# Patient Record
Sex: FEMALE | Race: WHITE | NOT HISPANIC OR LATINO | URBAN - METROPOLITAN AREA
[De-identification: names, ages, dates, MRNs, and addresses within clinical notes are randomized per-mention and may not be internally consistent; named-entity substitution may affect disease eponyms.]

---

## 2017-03-05 ENCOUNTER — EMERGENCY (EMERGENCY)
Facility: HOSPITAL | Age: 72
LOS: 1 days | Discharge: HOME | End: 2017-03-05
Attending: EMERGENCY MEDICINE | Admitting: EMERGENCY MEDICINE
Payer: MEDICARE

## 2017-03-05 VITALS
SYSTOLIC BLOOD PRESSURE: 144 MMHG | RESPIRATION RATE: 18 BRPM | DIASTOLIC BLOOD PRESSURE: 93 MMHG | OXYGEN SATURATION: 98 % | TEMPERATURE: 98 F | HEART RATE: 60 BPM | WEIGHT: 127.87 LBS

## 2017-03-05 VITALS
TEMPERATURE: 98 F | SYSTOLIC BLOOD PRESSURE: 132 MMHG | RESPIRATION RATE: 18 BRPM | DIASTOLIC BLOOD PRESSURE: 81 MMHG | HEART RATE: 58 BPM | OXYGEN SATURATION: 96 %

## 2017-03-05 DIAGNOSIS — R07.81 PLEURODYNIA: ICD-10-CM

## 2017-03-05 DIAGNOSIS — E11.9 TYPE 2 DIABETES MELLITUS WITHOUT COMPLICATIONS: ICD-10-CM

## 2017-03-05 LAB
ALBUMIN SERPL ELPH-MCNC: 3.5 G/DL — SIGNIFICANT CHANGE UP (ref 3.4–5)
ALP SERPL-CCNC: 67 U/L — SIGNIFICANT CHANGE UP (ref 40–120)
ALT FLD-CCNC: 48 U/L — HIGH (ref 12–42)
ANION GAP SERPL CALC-SCNC: 8 MMOL/L — LOW (ref 9–16)
AST SERPL-CCNC: 33 U/L — SIGNIFICANT CHANGE UP (ref 15–37)
BASOPHILS NFR BLD AUTO: 1.9 % — SIGNIFICANT CHANGE UP (ref 0–2)
BILIRUB SERPL-MCNC: 0.3 MG/DL — SIGNIFICANT CHANGE UP (ref 0.2–1.2)
BUN SERPL-MCNC: 22 MG/DL — SIGNIFICANT CHANGE UP (ref 7–23)
CALCIUM SERPL-MCNC: 9 MG/DL — SIGNIFICANT CHANGE UP (ref 8.5–10.5)
CHLORIDE SERPL-SCNC: 101 MMOL/L — SIGNIFICANT CHANGE UP (ref 96–108)
CK MB CFR SERPL CALC: <1 NG/ML — SIGNIFICANT CHANGE UP (ref 0.5–3.6)
CK SERPL-CCNC: 60 U/L — SIGNIFICANT CHANGE UP (ref 26–192)
CO2 SERPL-SCNC: 29 MMOL/L — SIGNIFICANT CHANGE UP (ref 22–31)
CREAT SERPL-MCNC: 1.06 MG/DL — SIGNIFICANT CHANGE UP (ref 0.5–1.3)
EOSINOPHIL NFR BLD AUTO: 2.9 % — SIGNIFICANT CHANGE UP (ref 0–6)
EXTRA BLUE TOP TUBE: SIGNIFICANT CHANGE UP
EXTRA SST TUBE: SIGNIFICANT CHANGE UP
GLUCOSE SERPL-MCNC: 91 MG/DL — SIGNIFICANT CHANGE UP (ref 70–99)
HCT VFR BLD CALC: 39.1 % — SIGNIFICANT CHANGE UP (ref 34.5–45)
HGB BLD-MCNC: 13.1 G/DL — SIGNIFICANT CHANGE UP (ref 11.5–15.5)
LYMPHOCYTES # BLD AUTO: 30.6 % — SIGNIFICANT CHANGE UP (ref 13–44)
MCHC RBC-ENTMCNC: 29.4 PG — SIGNIFICANT CHANGE UP (ref 27–34)
MCHC RBC-ENTMCNC: 33.5 G/DL — SIGNIFICANT CHANGE UP (ref 32–36)
MCV RBC AUTO: 87.7 FL — SIGNIFICANT CHANGE UP (ref 80–100)
MONOCYTES NFR BLD AUTO: 17.2 % — HIGH (ref 2–14)
NEUTROPHILS NFR BLD AUTO: 47.4 % — SIGNIFICANT CHANGE UP (ref 43–77)
PLATELET # BLD AUTO: 276 K/UL — SIGNIFICANT CHANGE UP (ref 150–400)
POTASSIUM SERPL-MCNC: 5 MMOL/L — SIGNIFICANT CHANGE UP (ref 3.5–5.3)
POTASSIUM SERPL-SCNC: 5 MMOL/L — SIGNIFICANT CHANGE UP (ref 3.5–5.3)
PROT SERPL-MCNC: 7.4 G/DL — SIGNIFICANT CHANGE UP (ref 6.4–8.2)
RBC # BLD: 4.46 M/UL — SIGNIFICANT CHANGE UP (ref 3.8–5.2)
RBC # FLD: 12.8 % — SIGNIFICANT CHANGE UP (ref 10.3–16.9)
SODIUM SERPL-SCNC: 138 MMOL/L — SIGNIFICANT CHANGE UP (ref 135–145)
TROPONIN I SERPL-MCNC: <0.015 NG/ML — SIGNIFICANT CHANGE UP (ref 0.01–0.04)
WBC # BLD: 4.2 K/UL — SIGNIFICANT CHANGE UP (ref 3.8–10.5)
WBC # FLD AUTO: 4.2 K/UL — SIGNIFICANT CHANGE UP (ref 3.8–10.5)

## 2017-03-05 PROCEDURE — 99284 EMERGENCY DEPT VISIT MOD MDM: CPT | Mod: 25

## 2017-03-05 PROCEDURE — 71275 CT ANGIOGRAPHY CHEST: CPT | Mod: 26

## 2017-03-05 PROCEDURE — 93005 ELECTROCARDIOGRAM TRACING: CPT

## 2017-03-05 PROCEDURE — 99284 EMERGENCY DEPT VISIT MOD MDM: CPT

## 2017-03-05 PROCEDURE — 82550 ASSAY OF CK (CPK): CPT

## 2017-03-05 PROCEDURE — 85379 FIBRIN DEGRADATION QUANT: CPT

## 2017-03-05 PROCEDURE — 71275 CT ANGIOGRAPHY CHEST: CPT

## 2017-03-05 PROCEDURE — 80053 COMPREHEN METABOLIC PANEL: CPT

## 2017-03-05 PROCEDURE — 84484 ASSAY OF TROPONIN QUANT: CPT

## 2017-03-05 PROCEDURE — 82553 CREATINE MB FRACTION: CPT

## 2017-03-05 PROCEDURE — 85025 COMPLETE CBC W/AUTO DIFF WBC: CPT

## 2017-03-05 NOTE — ED PROVIDER NOTE - MEDICAL DECISION MAKING DETAILS
72 yo female with recent international plane travel - vague left back and chest pain and malaise- CT chest  no overt signs to suggest infilt- no ekg changes or trop leak- low susp for malaria- iv infilt- d dimer slight elevation-  no overt suspicion for ongoing PE- VSS well appearing HD stable for d/c and f/u with pmd in 1 day- return precautions d/w pt including CP sob  N/V

## 2017-03-05 NOTE — ED ADULT TRIAGE NOTE - CHIEF COMPLAINT QUOTE
chest pain started 5 days ago when going to come back from [place]- "thought I was just tired" came home and "slept for 2 days ; associated nausea and dizziness- [place] [facility] , sent here

## 2017-03-05 NOTE — ED PROVIDER NOTE - CONDUCTED A DETAILED DISCUSSION WITH PATIENT AND/OR GUARDIAN REGARDING, MDM
lab results/radiology results lab results/need for outpatient follow-up/return to ED if symptoms worsen, persist or questions arise/radiology results

## 2017-03-05 NOTE — ED ADULT NURSE NOTE - OBJECTIVE STATEMENT
pt to ER w/ report of chest pressure, back pain and dizziness since Thursday morning.  Pt reports flying to [place] from [place] Thursday night.  Pt reports occasional cough, states she had several sick contacts during past week, denies n/v/sob/abd pain.  Breathing unlabored, skin warm and dry. 12 lead ekg done and shown to ER attending physician. Will continue to monitor.

## 2017-03-05 NOTE — ED CLERICAL - NS ED CLERK NOTE PRE-ARRIVAL INFORMATION; ADDITIONAL PRE-ARRIVAL INFORMATION
MARCIN [id number] COMING IN FOR CHEST PAIN. PLEASE CHECK SETS OF HYPONIN. PLEASE EVALUATE. PLEASE CALL DR. RAPHAEL [phone] AFTER PATIENT EVALUATION.(DR. PERSAUD)

## 2017-03-05 NOTE — ED PROVIDER NOTE - OBJECTIVE STATEMENT
72 yo female with hx of elevated cholesterol DM with recent 12 hr flight 4 days ago from [place]-  dev headache and nausea on friday followed by malaise  no fevers or chills slight cough x 1 day- no leg pain or calf swelling- slight left sided pleuritic CP today - last stress test was 5 years ago and was neg - some collegaues from trip were dx with pneumonia apparently  pt denies any bug or mosquito bites

## 2020-08-24 NOTE — ED PROVIDER NOTE - MUSCULOSKELETAL, MLM
no vomiting/no diarrhea/no abdominal pain/no headache
Spine appears normal, range of motion is not limited, no muscle or joint tenderness

## 2023-08-28 ENCOUNTER — EMERGENCY (EMERGENCY)
Facility: HOSPITAL | Age: 78
LOS: 1 days | Discharge: HOME | End: 2023-08-28
Admitting: STUDENT IN AN ORGANIZED HEALTH CARE EDUCATION/TRAINING PROGRAM
Payer: MEDICARE

## 2023-08-28 VITALS
OXYGEN SATURATION: 99 % | WEIGHT: 128.09 LBS | HEIGHT: 64 IN | SYSTOLIC BLOOD PRESSURE: 112 MMHG | RESPIRATION RATE: 18 BRPM | DIASTOLIC BLOOD PRESSURE: 71 MMHG | HEART RATE: 60 BPM | TEMPERATURE: 98 F

## 2023-08-28 DIAGNOSIS — S81.801A UNSPECIFIED OPEN WOUND, RIGHT LOWER LEG, INITIAL ENCOUNTER: ICD-10-CM

## 2023-08-28 DIAGNOSIS — E78.5 HYPERLIPIDEMIA, UNSPECIFIED: ICD-10-CM
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
## 2023-08-28 PROCEDURE — 99283 EMERGENCY DEPT VISIT LOW MDM: CPT

## 2023-08-28 PROCEDURE — 99284 EMERGENCY DEPT VISIT MOD MDM: CPT

## 2023-08-28 RX ORDER — CEPHALEXIN 500 MG
1 CAPSULE ORAL
Qty: 20 | Refills: 0
Start: 2023-08-28 | End: 2023-09-01

## 2023-08-28 RX ORDER — CEPHALEXIN 500 MG
500 CAPSULE ORAL ONCE
Refills: 0 | Status: COMPLETED | OUTPATIENT
Start: 2023-08-28 | End: 2023-08-28

## 2023-08-28 RX ADMIN — Medication 500 MILLIGRAM(S): at 22:20

## 2023-08-28 NOTE — ED PROVIDER NOTE - PATIENT PORTAL LINK FT
You can access the FollowMyHealth Patient Portal offered by [facility] by registering at the following website: [email or web address]. By joining [facility]’s FollowMyHealth portal, you will also be able to view your health information using other applications (apps) compatible with our system.

## 2023-08-28 NOTE — ED ADULT NURSE NOTE - NSFALLASSESSNEED_ED_ALL_ED
[address] [facility] EMERGENCY DEPARTMENT  [place] [address]  [phone]    Work/School Note    Date: 12/12/2021    To Whom It May concern:    Lucia Gomes was seen and treated today in the emergency room by the following provider(s):  Attending Provider: Hiral Diamond MD.      Lucia Gomes is excused from work/school on 12/12/21 and 12/13/21. She is medically clear to return to work/school on 12/14/2021.        Sincerely,          Joseph Longoria no

## 2023-08-28 NOTE — ED PROVIDER NOTE - OBJECTIVE STATEMENT
77 yr old female, history of HLD, presents to the Emergency Department for wound check. pt tripped two weeks ago and hit her right shin on a concrete step. had small cut at the time. has been cleaning and applying bacitracin and wound has overall been improving. now few days of small area of redness around the wound and increased pain at the area. no drainage. was concerned about infection so came for ED.  tetanus utd. not smoker, not diabetic. no fever, chills, n/v. ambulating w/o difficulty. no extremity weakness / numbness / tingling.

## 2023-08-28 NOTE — ED ADULT NURSE NOTE - NSFALLUNIVINTERV_ED_ALL_ED
Bed/Stretcher in lowest position, wheels locked, appropriate side rails in place/Call bell, personal items and telephone in reach/Instruct patient to call for assistance before getting out of bed/chair/stretcher/Non-slip footwear applied when patient is off stretcher/[place] to call system/Physically safe environment - no spills, clutter or unnecessary equipment/Purposeful proactive rounding/Room/bathroom lighting operational, light cord in reach

## 2023-08-28 NOTE — ED PROVIDER NOTE - CLINICAL SUMMARY MEDICAL DECISION MAKING FREE TEXT BOX
history of HLD, here w redness surrounding healing wound from hitting shin on concrete 2 weeks ago. ambulatory since. no infectious symptoms. tetanus previously utd.   pt nontoxic, stable vitals, exam reassuring - well healing / scabbed wound w small area of surrounding redness. no discharge, no fluctuance. no crepitus. neurovascularly intact. ambulatory w/o difficulty.   possible mild cellulitis surrounding wound but wound appears well healing. no abscess  will start keflex for cellulitis. reassuring no systemic symptoms.  red area marked w skin markers. return precautions discussed. has pmd to follow up with for wound check      All results reviewed with the patient verbally. Discharge plan and return precautions d/w pt who verbalized understanding and agrees with plan. All questions answered. Vitals WNL. Ready for d/c.

## 2023-08-28 NOTE — ED PROVIDER NOTE - NSFOLLOWUPINSTRUCTIONS_ED_ALL_ED_FT
Apply bacitracin once a day. Keep wound clean and dry.     Take antitiobics (KEFLEX) as prescribed. Finish entire course.     Return if the redness spreads beyond the marked line.     Take tylenol / motrin for pain.     Follow up with your primary care doctor within 1 week for continued evaluation.     Return to this Emergency Department for persistent, worsening, or new symptoms including severe pain uncontrolled by over the counter medication, high fever, uncontrollable nausea/vomiting, abdominal pain, increased redness or swelling at site, chest pain, shortness of breath, rash, or any other serious concerns.

## 2023-08-28 NOTE — ED PROVIDER NOTE - PHYSICAL EXAMINATION
RLE - 1cm healing wound to right anterior mid-shin    Constitutional : Well appearing, non-toxic, no acute distress. awake, alert, oriented to person, place, time/situation.  Head : head normocephalic, atraumatic  EENMT : eyes clear bilaterally, PERRL, EOMI. airway patent. moist mucous membranes. neck supple.  Cardiac : Extremities warm and well perfused. 2+ radial and DP pulses. cap refill <2 seconds. no LE edema.  Resp : Respirations even and unlabored.   MSK :  range of motion is not limited, no muscle or joint tenderness  Back : No evidence of trauma. No spinal or CVA tenderness.  Neuro : Alert and oriented, CNII-XII grossly intact, no focal deficits, no motor or sensory deficits.  Skin : Skin normal color for race, warm, dry and intact.   Psych : Alert and oriented to person, place, time/situation. normal mood and affect. no apparent risk to self or others. RLE - 1cm healing wound to right anterior mid-shin. no discharge / drainage. 1cm circumferential area of surrounding redness w mild tenderness. no fluctuance., no crepitus. no bony tenderness. good OR of extremity. ambulatory w/o difficulty. 2+ DP pulses. sensation intact distally. cap refill <2 seconds.     Constitutional : Well appearing, non-toxic, no acute distress. awake, alert, oriented to person, place, time/situation.  Head : head normocephalic, atraumatic  EENMT : eyes clear bilaterally, PERRL, EOMI. airway patent. moist mucous membranes. neck supple.  Cardiac : Extremities warm and well perfused. 2+ radial and DP pulses. cap refill <2 seconds. no LE edema.  Resp : Respirations even and unlabored.   MSK :  range of motion is not limited, no muscle or joint tenderness  Back : No evidence of trauma. No spinal or CVA tenderness.  Neuro : Alert and oriented, CNII-XII grossly intact, no focal deficits, no motor or sensory deficits.  Skin : Skin normal color for race, warm, dry and intact.   Psych : Alert and oriented to person, place, time/situation. normal mood and affect. no apparent risk to self or others.

## 2024-03-17 NOTE — ED ADULT NURSE NOTE - CHIEF COMPLAINT QUOTE
chest pain started 5 days ago when going to come back from [place]- "thought I was just tired" came home and "slept for 2 days ; associated nausea and dizziness- [facility] , sent here
TELEMETRY